# Patient Record
Sex: MALE | Race: BLACK OR AFRICAN AMERICAN | NOT HISPANIC OR LATINO | Employment: FULL TIME | URBAN - METROPOLITAN AREA
[De-identification: names, ages, dates, MRNs, and addresses within clinical notes are randomized per-mention and may not be internally consistent; named-entity substitution may affect disease eponyms.]

---

## 2022-11-04 ENCOUNTER — APPOINTMENT (OUTPATIENT)
Dept: CT IMAGING | Age: 67
End: 2022-11-04
Attending: STUDENT IN AN ORGANIZED HEALTH CARE EDUCATION/TRAINING PROGRAM

## 2022-11-07 ENCOUNTER — HOSPITAL ENCOUNTER (OUTPATIENT)
Dept: CT IMAGING | Age: 67
Discharge: HOME | End: 2022-11-07
Attending: STUDENT IN AN ORGANIZED HEALTH CARE EDUCATION/TRAINING PROGRAM

## 2022-11-07 DIAGNOSIS — R22.9 LOCALIZED MASS: ICD-10-CM

## 2022-11-07 PROCEDURE — 74177 CT ABD & PELVIS W/CONTRAST: CPT

## 2022-11-07 PROCEDURE — 10002805 HB CONTRAST AGENT

## 2022-11-07 RX ADMIN — IOHEXOL 96 ML: 350 INJECTION, SOLUTION INTRAVENOUS at 11:07

## 2024-03-26 ENCOUNTER — OFFICE VISIT (OUTPATIENT)
Dept: SURGERY | Facility: CLINIC | Age: 69
End: 2024-03-26

## 2024-03-26 VITALS — HEIGHT: 70 IN | WEIGHT: 160 LBS | BODY MASS INDEX: 22.9 KG/M2

## 2024-03-26 DIAGNOSIS — K40.90 RIGHT INGUINAL HERNIA: Primary | ICD-10-CM

## 2024-03-26 PROCEDURE — 99204 OFFICE O/P NEW MOD 45 MIN: CPT | Performed by: SURGERY

## 2024-03-26 RX ORDER — FLASH GLUCOSE SENSOR
KIT MISCELLANEOUS
COMMUNITY
Start: 2024-02-22

## 2024-03-26 RX ORDER — TRIAMCINOLONE ACETONIDE 5 MG/G
CREAM TOPICAL
COMMUNITY
Start: 2024-02-22

## 2024-03-26 RX ORDER — LANCETS 33 GAUGE
EACH MISCELLANEOUS
COMMUNITY
Start: 2023-10-06

## 2024-03-26 RX ORDER — ATORVASTATIN CALCIUM 10 MG/1
TABLET, FILM COATED ORAL
COMMUNITY

## 2024-03-26 RX ORDER — BLOOD SUGAR DIAGNOSTIC
STRIP MISCELLANEOUS
COMMUNITY
Start: 2023-12-05

## 2024-03-26 RX ORDER — BENZONATATE 200 MG/1
CAPSULE ORAL
COMMUNITY

## 2024-03-26 RX ORDER — SILDENAFIL 100 MG/1
TABLET, FILM COATED ORAL
COMMUNITY
Start: 2024-03-04

## 2024-03-26 RX ORDER — GLYBURIDE 5 MG/1
TABLET ORAL
COMMUNITY

## 2024-03-26 NOTE — PATIENT INSTRUCTIONS
Obtain your preoperative testing at [facility] same-day surgery room 1040 first floor    Stop aspirin and NSAIDs for 5 days prior to surgery.

## 2024-03-26 NOTE — H&P
History and Physical      HPI       HPI  Samuel Song is a 68 year old male who presents with a right inguinal hernia.  This was initially seen in 2022.  He complains of increasing discomfort in the groin.    No past medical history on file.  No past surgical history on file.  No current outpatient medications on file.     ALLERGIES  Not on File    Social History     Socioeconomic History    Marital status: Single     No family history on file.    Review of Systems   A comprehensive 10 point review of systems was completed.  Pertinent positives and negatives noted in the the HPI.    PHYSICAL EXAM   There were no vitals taken for this visit. No LMP for male patient.     Constitutional: appears well hydrated alert and responsive no acute distress noted  Head/Face: normocephalic  Nose/Mouth/Throat: nose and throat are clear palate is intact mucous membranes are moist no oral lesions are noted  Neck/Thyroid: neck is supple without adenopathy  Respiratory: normal to inspection lungs are clear to auscultation bilaterally normal respiratory effort  Cardiovascular: regular rate and rhythm no murmurs, gallups, or rubs  Abdomen: soft non-tender non-distended no organomegaly noted no masses  Right inguinal hernia  Extremities: no edema, cyanosis, or clubbing  Neurological: exam appropriate for age reflexes and motor skills appropriate for age      ASSESSMENT/PLAN   Assessment     Right inguinal hernia  68 year old male with right inguinal hernia  We have discussed the surgical risks, benefits, alternatives, and expected recovery. We will plan open repair right inguinal hernia with phasic's plug mesh. All of the patient's questions have been answered to his satisfaction.  [facility]       3/26/2024  Rodrigue Saeed MD

## 2024-03-27 ENCOUNTER — TELEPHONE (OUTPATIENT)
Dept: SURGERY | Facility: CLINIC | Age: 69
End: 2024-03-27

## 2024-03-27 NOTE — TELEPHONE ENCOUNTER
Pt called.  Appointment yesterday 3-26-24.  Pt has a couple follow up questions.  Please call pt

## 2024-03-28 ENCOUNTER — TELEPHONE (OUTPATIENT)
Dept: SURGERY | Facility: CLINIC | Age: 69
End: 2024-03-28

## 2024-03-28 NOTE — TELEPHONE ENCOUNTER
Patient has questions about mesh and surgery.  Message to call the patient passed to the provider.    ROSAS

## 2024-03-28 NOTE — TELEPHONE ENCOUNTER
Pt called requesting information on how to submit forms.  Gave pt fax and email info.  Pt will give to employer to send forms.

## 2024-04-03 NOTE — TELEPHONE ENCOUNTER
Disab forms received via fax in forms dept. Logged for processing. MC msg sent for JOSE LUIS completion

## 2024-04-08 ENCOUNTER — TELEPHONE (OUTPATIENT)
Dept: SURGERY | Facility: CLINIC | Age: 69
End: 2024-04-08

## 2024-04-08 NOTE — TELEPHONE ENCOUNTER
Patient called to request work excuse for surgery patient is having 04/26. If you can please call back with update

## 2024-04-08 NOTE — TELEPHONE ENCOUNTER
Pt calling asking what he needs to do to have his forms sent to [facility]. Explained to pt that we sent him a [employer] message on 4/3/24 with an authorization form that needs to be filled out. Informed him that unless we have that form or an authorization form from [facility] that we can only release his completed forms to him and will be unable to fax them to [facility] for him. Also obtained details:      Type of Leave:  STD  Reason for Leave: hernia surgery on 4/26/24  Start date of leave: 4/26/24  How much time needed?: 4-8wks   Forms Due Date: unsure   Was Fee and Turnaround info Given?: yes

## 2024-04-09 NOTE — TELEPHONE ENCOUNTER
Dr. Saeed,      *The ACKNOWLEDGE button has been moved to the top right ribbon*    Please sign off on form if you agree to: Disab due to hernia sx 4/26/24  (place your signature on the first page only)    -From your Inbasket, Highlight the patient and click Chart   -Double click the 3/28/24 Forms Completion telephone encounter  -Scroll down to the Media section   -Click the blue Hyperlink: Disab Dr. Saeed 4/9/24  -Click Acknowledge located in the top right ribbon/menu   -Drag the mouse into the blank space of the document and a + sign will appear. Left click to   electronically sign the document.     Thank you,  Kathie

## 2024-04-12 NOTE — TELEPHONE ENCOUNTER
Called pt to inform him forms are completed and will upload them to his MC, no JOSE LUIS on file. Advised pt he can fill out JOSE LUIS forms we sent him 4/3/24 so that we can fax forms to [employer], I offered to walk him thorough JOSE LUIS completion but pt stated he was at work and could not talk.

## 2024-04-12 NOTE — TELEPHONE ENCOUNTER
JOSE LUIS received. Scanned into patient chart. Called pt to let him know the forms will be faxed out to [employer].

## 2024-04-12 NOTE — TELEPHONE ENCOUNTER
Patient called and explained how to fill out JOSE LUIS thoroughly. Patient understood and is now going to be emailing it over to us. I told him that I would look out for it.

## 2024-04-16 ENCOUNTER — TELEPHONE (OUTPATIENT)
Dept: SURGERY | Facility: CLINIC | Age: 69
End: 2024-04-16

## 2024-04-16 NOTE — TELEPHONE ENCOUNTER
Rec'd call from anesthesiologist stating patient's EKG is abnormal and he will need cardiac clearance.  Informed Dr. Saeed.  Contacted patient regarding above information.  Offered Cardiologist here at [facility] and he states he will call PCP Dr. Jesse Espinal to get a doctor in [place].

## 2024-04-16 NOTE — TELEPHONE ENCOUNTER
Patient is calling and states his cardiologist is requesting a cardiac clearance be sent. Fax number is [phone] please send to Dr. Muller. Patient also wanted to make  aware soonest he was able to get in is [phone].  Please advise.

## 2024-04-16 NOTE — TELEPHONE ENCOUNTER
PT called to see if we can send him cardiac clearance paperwork.     I told him our department doesn't physically have those types of forms, and he asked if I can transfer his call to Dr. Saeed's office to see if they can give him one.

## 2024-04-18 ENCOUNTER — TELEPHONE (OUTPATIENT)
Dept: SURGERY | Facility: CLINIC | Age: 69
End: 2024-04-18

## 2024-04-24 ENCOUNTER — TELEPHONE (OUTPATIENT)
Dept: SURGERY | Facility: CLINIC | Age: 69
End: 2024-04-24

## 2024-05-08 ENCOUNTER — OFFICE VISIT (OUTPATIENT)
Dept: SURGERY | Facility: CLINIC | Age: 69
End: 2024-05-08

## 2024-05-08 VITALS — WEIGHT: 160 LBS | BODY MASS INDEX: 22.9 KG/M2 | HEIGHT: 70 IN

## 2024-05-08 DIAGNOSIS — Z98.890 POST-OPERATIVE STATE: Primary | ICD-10-CM

## 2024-05-08 PROCEDURE — 99024 POSTOP FOLLOW-UP VISIT: CPT | Performed by: SURGERY

## 2024-05-08 NOTE — PROGRESS NOTES
Postoperative Patient Follow-up      5/8/2024    HPI  Chief Complaint   Patient presents with    Post-Op     Patient here for post op Right Inguinal Hernia Repair surgery at [facility] on 4-.  Patient reports pain is decreasing.  Patient is having normal Urination and BM's .         Samuel Song is a 68 year old male.  After open repair right inguinal hernia with mesh.  Doing well      Exam  Wound is clean dry intact minimal swelling      Assessment/Plan  Assessment   1. Post-operative state        Doing well.  Off 6 weeks.  Call for problems         Rodrigue Saeed MD

## 2024-05-21 ENCOUNTER — TELEPHONE (OUTPATIENT)
Dept: SURGERY | Facility: CLINIC | Age: 69
End: 2024-05-21

## 2024-05-21 NOTE — TELEPHONE ENCOUNTER
Surgery at [facility] on 4- for RIH repair with Dr. Saeed.   OP reports sent to scanning 5-21-24.  Cherri

## 2024-05-30 ENCOUNTER — TELEPHONE (OUTPATIENT)
Dept: SURGERY | Facility: CLINIC | Age: 69
End: 2024-05-30

## 2024-05-30 NOTE — TELEPHONE ENCOUNTER
Patient called to share that Jessemehul needs more documentation for his recent disability.     I told him they need to provide us a letter detailing what information they need from our department. He says he plans on calling Steven back.    Patient expressed understanding.

## 2024-06-06 NOTE — TELEPHONE ENCOUNTER
Patient called and states he spoke with [facility]. Per [facility] forms were being faxed now. Informed patient nothing received as of yet. Advised patient to call later this afternoon to confirm if we received them. Patient verbalized understanding.

## 2024-06-06 NOTE — TELEPHONE ENCOUNTER
Patient called and states [employer] sent us forms to be completed for his claim. Advised patient no forms have been received and to contact [employer] to have them send it to us again, Per patient he just spoke to them and they did send forms last week, Advised patient no forms were received and he will need to wait/call back to check status because as of right now no forms are pending for processing for his claim. Verbal understanding from patient

## 2024-06-07 NOTE — TELEPHONE ENCOUNTER
Please try to avoid signing forms in the corner as it is not visible when printing and forms are not accepted this way. Thank you!     Dr. Saeed    THERE ARE 2 FORMS THAT NEED SIGNING, THANK YOU     *The ACKNOWLEDGE button has been moved to the top right ribbon*    Please sign off on form if you agree to: disability & restrictions   (place your signature on the first page only)    -From your Inbasket, Highlight the patient and click Chart   -Double click the 05/30/2024 Forms Completion telephone encounter  -Scroll down to the Media section   -Click the blue Hyperlink: disability & Restrictions Dr. Rodrigue Saeed 06/07/2024  -Click Acknowledge located in the top right ribbon/menu   -Drag the mouse into the blank space of the document and a + sign will appear. Left click to   electronically sign the document.     Thank you,  Annie ANTONY

## 2024-06-07 NOTE — TELEPHONE ENCOUNTER
Protected health information with valid authorization from [employer] received via fax. Logged for processing.

## 2024-06-07 NOTE — TELEPHONE ENCOUNTER
Patient called to confirm receipt of forms, adv forms had arrived and I pulled into my box to process patient aware of next steps

## 2024-06-11 NOTE — TELEPHONE ENCOUNTER
Both Restrictions and Disability forms faxed to [employer] @ [phone] as requested, sending MyChart to adv patient. Archiving forms.

## 2024-06-25 ENCOUNTER — OFFICE VISIT (OUTPATIENT)
Dept: SURGERY | Facility: CLINIC | Age: 69
End: 2024-06-25

## 2024-06-25 VITALS — WEIGHT: 160 LBS | BODY MASS INDEX: 22.9 KG/M2 | HEIGHT: 70 IN

## 2024-06-25 DIAGNOSIS — R10.31 RIGHT INGUINAL PAIN: Primary | ICD-10-CM

## 2024-06-25 PROCEDURE — 99213 OFFICE O/P EST LOW 20 MIN: CPT | Performed by: SURGERY

## 2024-06-25 NOTE — PATIENT INSTRUCTIONS
Make an evaluation with physical therapy.  Tylenol or ibuprofen if severe pain.    You may also use warm compresses as needed.

## 2024-06-25 NOTE — PROGRESS NOTES
[facility]    Progress Note    Samuel Song Patient Status:  Specimen    1955 MRN [id number]   Location [facility] Attending No att. providers found   Hosp Day # 0 PCP CHELSY HENDRIX MD     Assessment and Plan:     Complains of pain at the hernia site and numbness just inferior to this  -No recurrence.  Evaluation with physical therapy    Subjective:   Pain at the hernia site and some numbness    Objective:   Patient Vitals for the past 24 hrs:   Height Weight   24 1247 5' 10\" (1.778 m) 160 lb (72.6 kg)           Exam: Excellent postop healing.    Results:   No results found for: \"WBC\", \"HGB\", \"HCT\", \"PLT\", \"CREATSERUM\", \"BUN\", \"NA\", \"K\", \"CL\", \"CO2\", \"GLU\", \"CA\", \"ALB\", \"ALKPHO\", \"BILT\", \"TP\", \"AST\", \"ALT\", \"PTT\", \"INR\", \"PT\", \"T4F\", \"TSH\", \"TSHREFLEX\", \"MARJAN\", \"LIP\", \"GGT\", \"PSA\", \"DDIMER\", \"ESRML\", \"ESRPF\", \"CRP\", \"BNP\", \"MG\", \"PHOS\", \"TROP\", \"CK\", \"CKMB\", \"NOLA\", \"RPR\", \"B12\", \"ETOH\", \"POCGLU\"    No results found.            Rodrigue Saeed MD  2024

## 2024-06-27 ENCOUNTER — TELEPHONE (OUTPATIENT)
Dept: SURGERY | Facility: CLINIC | Age: 69
End: 2024-06-27

## 2024-06-27 NOTE — TELEPHONE ENCOUNTER
Received short term disability forms in forms department with Americans with Disabilities Act questionnaire. Valid auth on file. Logged for processing.

## 2024-06-28 NOTE — TELEPHONE ENCOUNTER
Patient called to check status of Americans with Disabilities Act form. Informed patient form received and logged for processing 6/27/24, informed patient of processing time of 5-7 business days. Patient verbalized understanding.

## 2024-07-08 NOTE — TELEPHONE ENCOUNTER
Nani Saeed,    This patient's disability ran out on 6/25/24 when he had his follow up with you.     He is requesting to extend his leave until 7/29/24.     Do you support this request?    Thank you,  Araceli

## 2024-07-09 NOTE — TELEPHONE ENCOUNTER
7-9-2024.  Returned call to the patient.  He thought he was still on medicla leave because provider referred him to Physical therapy.  Surgery was 4-.  He was inappropriate with language about provider and getting fired.  He has not been paid for the past two weeks. He is still not working.     I was clear when I scheduled the surgery 4 wks is normal recovery and 6 is the maximum time off for the type of surgery he had.  Patient still swearing and unhappy about medical leave.  I offered a letter to return to work tomorrow full duty.  He is .  He hung up on me.  I wrote letter and he can return to work tomorrow full duty.     I will inform the provider.    Cherri

## 2024-07-09 NOTE — TELEPHONE ENCOUNTER
Called patient to inform him dr. Saeed was not approving an extension of his leave at this time, and would like patient to see his PCP. Patient seemed confused and stated his visit on 6/25/24 was meant for the extension, and also seemed confused why dr. Saeed did not tell him he wasn't approving an extension. Advised patient to reach out to dr. Saeed's office and possibly get connected to an RN there to discuss.

## 2024-07-11 ENCOUNTER — TELEPHONE (OUTPATIENT)
Dept: SURGERY | Facility: CLINIC | Age: 69
End: 2024-07-11

## 2024-07-11 NOTE — TELEPHONE ENCOUNTER
Gamal from [facility] is requesting information of the patient for short term disability forms. He states they need the diagnosis and restrictions for patient at work. Please call Gamal back at [phone]. Patient claim number is [id number]. Please advise.

## 2024-07-11 NOTE — TELEPHONE ENCOUNTER
Please forward to the forms department.  Dr. Saeed saw this patient twice after the surgery for post op pain.  He referred him to physical therapy.  We wrote him a letter to return to work and the patient was informed.    Dr. Saeed approved and signed the letter if the patient needs a copy.      Thanks Cherri

## 2024-07-16 ENCOUNTER — TELEPHONE (OUTPATIENT)
Dept: SURGERY | Facility: CLINIC | Age: 69
End: 2024-07-16

## 2024-09-26 ASSESSMENT — ACTIVITIES OF DAILY LIVING (ADL)
ADL_SCORE: 12
SENSORY_SUPPORT_DEVICES: EYEGLASSES
ADL_BEFORE_ADMISSION: INDEPENDENT
ADL_SHORT_OF_BREATH: NO
NEEDS_ASSIST: NO
RECENT_DECLINE_ADL: NO
HISTORY OF FALLING IN THE LAST YEAR (PRIOR TO ADMISSION): NO

## 2024-10-01 ENCOUNTER — HOSPITAL ENCOUNTER (OUTPATIENT)
Age: 69
Discharge: HOME | End: 2024-10-01
Attending: UROLOGY | Admitting: UROLOGY

## 2024-10-01 ENCOUNTER — ANESTHESIA EVENT (OUTPATIENT)
Dept: SURGERY | Age: 69
End: 2024-10-01

## 2024-10-01 ENCOUNTER — ANESTHESIA (OUTPATIENT)
Dept: SURGERY | Age: 69
End: 2024-10-01

## 2024-10-01 VITALS
DIASTOLIC BLOOD PRESSURE: 86 MMHG | HEART RATE: 58 BPM | HEIGHT: 70 IN | TEMPERATURE: 97.2 F | WEIGHT: 150 LBS | RESPIRATION RATE: 14 BRPM | OXYGEN SATURATION: 99 % | SYSTOLIC BLOOD PRESSURE: 124 MMHG | BODY MASS INDEX: 21.47 KG/M2

## 2024-10-01 LAB — GLUCOSE BLDC GLUCOMTR-MCNC: 168 MG/DL (ref 70–99)

## 2024-10-01 PROCEDURE — 10002807 HB RX 258: Performed by: UROLOGY

## 2024-10-01 PROCEDURE — 10002800 HB RX 250 W HCPCS: Performed by: ANESTHESIOLOGY

## 2024-10-01 PROCEDURE — 10002807 HB RX 258: Performed by: ANESTHESIOLOGY

## 2024-10-01 PROCEDURE — 13000034 HB BASIC CASE  S/U +1ST 15 MIN: Performed by: UROLOGY

## 2024-10-01 PROCEDURE — G0416 PROSTATE BIOPSY, ANY MTHD: HCPCS | Performed by: UROLOGY

## 2024-10-01 PROCEDURE — 82962 GLUCOSE BLOOD TEST: CPT

## 2024-10-01 PROCEDURE — 13000001 HB PHASE II RECOVERY EA 30 MINUTES: Performed by: UROLOGY

## 2024-10-01 PROCEDURE — 13000006 HB ANESTHESIA MAC S/U + 1ST 15 MIN: Performed by: UROLOGY

## 2024-10-01 PROCEDURE — 10002801 HB RX 250 W/O HCPCS: Performed by: ANESTHESIOLOGY

## 2024-10-01 RX ORDER — PROPOFOL 10 MG/ML
INJECTION, EMULSION INTRAVENOUS PRN
Status: DISCONTINUED | OUTPATIENT
Start: 2024-10-01 | End: 2024-10-01

## 2024-10-01 RX ORDER — SODIUM CHLORIDE, SODIUM LACTATE, POTASSIUM CHLORIDE, CALCIUM CHLORIDE 600; 310; 30; 20 MG/100ML; MG/100ML; MG/100ML; MG/100ML
INJECTION, SOLUTION INTRAVENOUS CONTINUOUS
Status: DISCONTINUED | OUTPATIENT
Start: 2024-10-01 | End: 2024-10-01 | Stop reason: HOSPADM

## 2024-10-01 RX ORDER — LEVOFLOXACIN 500 MG/1
500 TABLET, FILM COATED ORAL DAILY
Qty: 7 TABLET | Refills: 0 | Status: SHIPPED | OUTPATIENT
Start: 2024-10-01 | End: 2024-10-08

## 2024-10-01 RX ORDER — SODIUM CHLORIDE, SODIUM LACTATE, POTASSIUM CHLORIDE, CALCIUM CHLORIDE 600; 310; 30; 20 MG/100ML; MG/100ML; MG/100ML; MG/100ML
INJECTION, SOLUTION INTRAVENOUS CONTINUOUS PRN
Status: DISCONTINUED | OUTPATIENT
Start: 2024-10-01 | End: 2024-10-01

## 2024-10-01 RX ORDER — DEXTROSE MONOHYDRATE 25 G/50ML
25 INJECTION, SOLUTION INTRAVENOUS PRN
Status: DISCONTINUED | OUTPATIENT
Start: 2024-10-01 | End: 2024-10-01 | Stop reason: HOSPADM

## 2024-10-01 RX ORDER — LIDOCAINE HYDROCHLORIDE 20 MG/ML
INJECTION, SOLUTION INFILTRATION; PERINEURAL PRN
Status: DISCONTINUED | OUTPATIENT
Start: 2024-10-01 | End: 2024-10-01

## 2024-10-01 RX ADMIN — GENTAMICIN SULFATE 140 MG: 40 INJECTION, SOLUTION INTRAMUSCULAR; INTRAVENOUS at 12:18

## 2024-10-01 RX ADMIN — SODIUM CHLORIDE, POTASSIUM CHLORIDE, SODIUM LACTATE AND CALCIUM CHLORIDE: 600; 310; 30; 20 INJECTION, SOLUTION INTRAVENOUS at 11:35

## 2024-10-01 RX ADMIN — PROPOFOL INJECTABLE EMULSION 100 MCG/KG/MIN: 10 INJECTION, EMULSION INTRAVENOUS at 12:21

## 2024-10-01 RX ADMIN — LIDOCAINE HYDROCHLORIDE 5 ML: 20 INJECTION, SOLUTION INFILTRATION; PERINEURAL at 12:21

## 2024-10-01 RX ADMIN — CEFAZOLIN SODIUM 2000 MG: 300 INJECTION, POWDER, LYOPHILIZED, FOR SOLUTION INTRAVENOUS at 12:19

## 2024-10-01 RX ADMIN — PROPOFOL 80 MG: 10 INJECTION, EMULSION INTRAVENOUS at 12:21

## 2024-10-01 RX ADMIN — SODIUM CHLORIDE, POTASSIUM CHLORIDE, SODIUM LACTATE AND CALCIUM CHLORIDE: 600; 310; 30; 20 INJECTION, SOLUTION INTRAVENOUS at 12:15

## 2024-10-01 ASSESSMENT — PAIN SCALES - WONG BAKER: WONGBAKER_NUMERICALRESPONSE: 0

## 2024-10-01 ASSESSMENT — PAIN SCALES - GENERAL
PAINLEVEL_OUTOF10: 0
PAINLEVEL_OUTOF10: 0

## 2024-10-01 ASSESSMENT — ENCOUNTER SYMPTOMS: EXERCISE TOLERANCE: GOOD (>4 METS)

## 2024-10-04 LAB
ASR DISCLAIMER: NORMAL
CASE RPRT: NORMAL
CLINICAL INFO: NORMAL
PATH REPORT.FINAL DX SPEC: NORMAL
PATH REPORT.GROSS SPEC: NORMAL

## 2025-02-02 ENCOUNTER — HOSPITAL ENCOUNTER (EMERGENCY)
Age: 70
Discharge: HOME | End: 2025-02-02
Attending: STUDENT IN AN ORGANIZED HEALTH CARE EDUCATION/TRAINING PROGRAM

## 2025-02-02 VITALS
TEMPERATURE: 97.5 F | WEIGHT: 160 LBS | HEART RATE: 93 BPM | RESPIRATION RATE: 17 BRPM | DIASTOLIC BLOOD PRESSURE: 83 MMHG | HEIGHT: 70 IN | BODY MASS INDEX: 22.9 KG/M2 | OXYGEN SATURATION: 100 % | SYSTOLIC BLOOD PRESSURE: 146 MMHG

## 2025-02-02 DIAGNOSIS — K11.20 SIALOADENITIS: Primary | ICD-10-CM

## 2025-02-02 LAB
FLUAV RNA RESP QL NAA+PROBE: NOT DETECTED
FLUBV RNA RESP QL NAA+PROBE: NOT DETECTED
RSV AG NPH QL IA.RAPID: NOT DETECTED
S PYO DNA THROAT QL NAA+PROBE: NOT DETECTED
SARS-COV-2 RNA RESP QL NAA+PROBE: NOT DETECTED
SERVICE CMNT-IMP: NORMAL
SERVICE CMNT-IMP: NORMAL

## 2025-02-02 PROCEDURE — 87651 STREP A DNA AMP PROBE: CPT | Performed by: STUDENT IN AN ORGANIZED HEALTH CARE EDUCATION/TRAINING PROGRAM

## 2025-02-02 PROCEDURE — 99283 EMERGENCY DEPT VISIT LOW MDM: CPT

## 2025-02-02 PROCEDURE — 0241U COVID/FLU/RSV PANEL: CPT | Performed by: STUDENT IN AN ORGANIZED HEALTH CARE EDUCATION/TRAINING PROGRAM

## 2025-02-02 PROCEDURE — 99283 EMERGENCY DEPT VISIT LOW MDM: CPT | Performed by: STUDENT IN AN ORGANIZED HEALTH CARE EDUCATION/TRAINING PROGRAM

## 2025-02-02 ASSESSMENT — ENCOUNTER SYMPTOMS
TROUBLE SWALLOWING: 0
COUGH: 0
CHILLS: 0
FEVER: 0
SORE THROAT: 0
VOMITING: 0
ABDOMINAL PAIN: 0
NAUSEA: 0
VOICE CHANGE: 1
HEADACHES: 0
SHORTNESS OF BREATH: 0
DIZZINESS: 0

## 2025-02-10 DIAGNOSIS — R59.1 LYMPHADENOPATHY: Primary | ICD-10-CM

## 2025-03-28 ENCOUNTER — APPOINTMENT (OUTPATIENT)
Dept: ULTRASOUND IMAGING | Age: 70
End: 2025-03-28
Attending: INTERNAL MEDICINE

## (undated) DEVICE — Device

## (undated) DEVICE — GLOVE SURG 8 PROTEXIS LF CRM PF BEAD CUFF STRL PLISPRN 12IN

## (undated) DEVICE — GLOVE SURG 8.5 PROTEXIS LF CRM PF BEAD CUFF STRL PLISPRN 12

## (undated) DEVICE — GLOVE SURG 7.5 PROTEXIS LF CRM PF SMTH BEAD CUFF STRL

## (undated) NOTE — LETTER
7/9/2024          To Whom It May Concern:    Samuel Song is currently under my medical care.   He required surgery on 4-.    He required medical leave for recovery.  He experienced post op complications and required physical therapy.  He can return to work on Wednesday, 7- full duty with no restrictions.     If you require additional information please contact our office.        Sincerely,            Rodrigue Saeed MD          Document generated by:  Cherri MUÑOZ CMA

## (undated) NOTE — LETTER
7/9/2024          To Whom It May Concern:    Samuel Song is currently under my medical care.  He required surgery on 4-.    He required medical leave for recovery.  In addition he required additional recovery for physical therapy.      He can return to work on Wednesday, 7-9-2024 with no restrictions.      If you require additional information please contact our office.      Sincerely,          Rodrigue Saeed MD          Document generated by:  Cherri MUÑOZ CMA